# Patient Record
Sex: MALE
[De-identification: names, ages, dates, MRNs, and addresses within clinical notes are randomized per-mention and may not be internally consistent; named-entity substitution may affect disease eponyms.]

---

## 2018-04-17 ENCOUNTER — TRANSCRIPTION ENCOUNTER (OUTPATIENT)
Age: 48
End: 2018-04-17

## 2022-10-26 ENCOUNTER — NON-APPOINTMENT (OUTPATIENT)
Age: 52
End: 2022-10-26

## 2022-10-27 PROBLEM — Z00.00 ENCOUNTER FOR PREVENTIVE HEALTH EXAMINATION: Status: ACTIVE | Noted: 2022-10-27

## 2022-10-28 ENCOUNTER — TRANSCRIPTION ENCOUNTER (OUTPATIENT)
Age: 52
End: 2022-10-28

## 2022-10-28 ENCOUNTER — NON-APPOINTMENT (OUTPATIENT)
Age: 52
End: 2022-10-28

## 2022-10-28 ENCOUNTER — APPOINTMENT (OUTPATIENT)
Dept: OTOLARYNGOLOGY | Facility: CLINIC | Age: 52
End: 2022-10-28

## 2022-10-28 VITALS
DIASTOLIC BLOOD PRESSURE: 81 MMHG | WEIGHT: 235 LBS | HEIGHT: 69 IN | SYSTOLIC BLOOD PRESSURE: 118 MMHG | BODY MASS INDEX: 34.8 KG/M2 | HEART RATE: 81 BPM

## 2022-10-28 DIAGNOSIS — J31.0 CHRONIC RHINITIS: ICD-10-CM

## 2022-10-28 DIAGNOSIS — H93.13 TINNITUS, BILATERAL: ICD-10-CM

## 2022-10-28 DIAGNOSIS — Z57.0 OCCUPATIONAL EXPOSURE TO NOISE: ICD-10-CM

## 2022-10-28 DIAGNOSIS — H90.5 UNSPECIFIED SENSORINEURAL HEARING LOSS: ICD-10-CM

## 2022-10-28 PROCEDURE — 99203 OFFICE O/P NEW LOW 30 MIN: CPT

## 2022-10-28 PROCEDURE — 92557 COMPREHENSIVE HEARING TEST: CPT

## 2022-10-28 PROCEDURE — 92550 TYMPANOMETRY & REFLEX THRESH: CPT

## 2022-10-28 PROCEDURE — 99072 ADDL SUPL MATRL&STAF TM PHE: CPT

## 2022-10-28 SDOH — HEALTH STABILITY - PHYSICAL HEALTH: OCCUPATIONAL EXPOSURE TO NOISE: Z57.0

## 2022-10-28 NOTE — HISTORY OF PRESENT ILLNESS
[de-identified] : The patient presents today for hearing evaluation. 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 He was given ear protection 10 years ago but he wasn’t always able to wear it since it's not safe, he needs to communicate with other workers. Pt is now retired and last he was exposed to loud noise was 2 years ago. Pt also has b/l tinnitus. Pt states firing a gun recently but he was wearing ear protection the whole time - pt was noticing decreased hearing since before using the gun. Pt denies any other noise exposure. Pt is right handed

## 2022-10-28 NOTE — DATA REVIEWED
[de-identified] : \par -TYMPS: TYPE A AD, TYPE Ad AS (ETF ABNORMAL AU)\par -HEARING -1000 HZ SLOPING TO A MILD TO MOD SNHL [id number] HZ AUU\par

## 2022-10-28 NOTE — ADDENDUM
[FreeTextEntry1] : Documented by Sowmya Carranza acting as scribe for Dr. Genao on 10/28/2022. \par \par All Medical record entries made by the scribe were at my. Dr. Genao direction and personally dictated by me on 10/28/2022. I have reviewed the chart and agree that the record accurately reflects my personal performance of the history, physical exam, assessment and plan. I have also personally directed, reviewed, and agreed with the discharge instructions.

## 2022-10-28 NOTE — ASSESSMENT
[FreeTextEntry1] : Reviewed and reconciled medications, allergies, PMHx, PSHx, SocHx, FMHx. \par \par The patient presents today for hearing evaluation \par \par Physcial exam - \par air greater then bone on right and left \par deviated septum, inflamed turbs \par \par Audio:\par -TYMPS: TYPE A AD, TYPE Ad AS (ETF ABNORMAL AU)\par -HEARING -1000 HZ SLOPING TO A MILD TO MOD SNHL [id number] HZ AUU\par right 100% discrim 60db TPP -50\par left 92% discrim 60db TPP -69\par borderline candidate for hearing aids \par pt is right handed but his left ear is worse then right \par 2.5% hearing loss under workmen comp guideline \par \par Plan:\par try using flonase or sinus cones. Audio - results interpreted by Dr. Genao and reviewed with the patient. Discussed r/b/a of cautery of turbs in office. ABR ordered. FU after ABR

## 2022-10-28 NOTE — PHYSICAL EXAM
[Hearing Desai Test (Tuning Fork On Forehead)] : no lateralization of tone [Normal] : mucosa is normal [Midline] : trachea located in midline position [FreeTextEntry5] : air greater then bone on right and left  [FreeTextEntry1] : deviated septum \par inflamed turbs  [de-identified] : UP3, tongue tie